# Patient Record
Sex: FEMALE | Race: WHITE | Employment: PART TIME | URBAN - METROPOLITAN AREA
[De-identification: names, ages, dates, MRNs, and addresses within clinical notes are randomized per-mention and may not be internally consistent; named-entity substitution may affect disease eponyms.]

---

## 2024-10-19 ENCOUNTER — HOSPITAL ENCOUNTER (EMERGENCY)
Age: 19
Discharge: HOME | End: 2024-10-19
Attending: EMERGENCY MEDICINE
Payer: COMMERCIAL

## 2024-10-19 ENCOUNTER — APPOINTMENT (OUTPATIENT)
Age: 19
End: 2024-10-19
Payer: COMMERCIAL

## 2024-10-19 VITALS
HEIGHT: 67 IN | SYSTOLIC BLOOD PRESSURE: 122 MMHG | OXYGEN SATURATION: 100 % | BODY MASS INDEX: 39.85 KG/M2 | DIASTOLIC BLOOD PRESSURE: 85 MMHG | TEMPERATURE: 98.1 F | WEIGHT: 253.9 LBS | HEART RATE: 73 BPM | RESPIRATION RATE: 18 BRPM

## 2024-10-19 DIAGNOSIS — S09.90XA INJURY OF HEAD, INITIAL ENCOUNTER: Primary | ICD-10-CM

## 2024-10-19 PROCEDURE — 99284 EMERGENCY DEPT VISIT MOD MDM: CPT

## 2024-10-19 PROCEDURE — 70450 CT HEAD/BRAIN W/O DYE: CPT

## 2024-10-20 NOTE — ED NOTES
Verbal and written discharge instructions discussed with patient. Patient verbally acknowledged all information provided. Patient is alert and acting appropriately for developmental age. Skin p/w/d. Respirations even and unlabored. Patient is to follow up as indicated. Encouraged patient to return to ED for any new or worsening symptoms. Patient denies any additional needs or concerns to note.

## 2024-10-20 NOTE — ED PROVIDER NOTES
I PERSONALLY SAW THE PATIENT AND PERFORMED A SUBSTANTIVE PORTION OF THE VISIT INCLUDING ALL ASPECTS OF THE MEDICAL DECISION MAKING PROCESS.    [facility] EMERGENCY DEPT  EMERGENCY DEPARTMENT ENCOUNTER      Pt Name: Caitie Muniz  MRN: [id number]  Birthdate 2005  Date of evaluation: 10/19/2024  Provider: Srini Tran MD    CHIEF COMPLAINT       Chief Complaint   Patient presents with    Head Injury     Pt state that she was working at scare [employer] and was walking back to her hiding place and hit her head        HISTORY OF PRESENT ILLNESS    Caitie Muniz is a 19 y.o. adult who presents to the emergency department with head trauma.  Head trauma today at work.  No other associated symptoms.  No eczema.    Nursing Notes were reviewed. Including nursing noted for FM, Surgical History, Past Medical History, Social History, vitals, and allergies; agree with all.     REVIEW OF SYSTEMS       Review of Systems    Except as noted above the remainder of the review of systems was reviewed and negative.     PAST MEDICAL HISTORY   History reviewed. No pertinent past medical history.    SURGICAL HISTORY     History reviewed. No pertinent surgical history.    CURRENT MEDICATIONS       Previous Medications    No medications on file       ALLERGIES     Patient has no known allergies.    FAMILY HISTORY      History reviewed. No pertinent family history.    SOCIAL HISTORY       Social History     Socioeconomic History    Marital status: Single     Spouse name: None    Number of children: None    Years of education: None    Highest education level: None   Tobacco Use    Smoking status: Never    Smokeless tobacco: Never   Substance and Sexual Activity    Alcohol use: Yes     Comment: occ    Drug use: Yes     Types: Marijuana (Weed)     Social Determinants of Health     Intimate Partner Violence: Low Risk  (9/1/2021)    Received from [facility]    Intimate Partner Violence     If you